# Patient Record
Sex: FEMALE
[De-identification: names, ages, dates, MRNs, and addresses within clinical notes are randomized per-mention and may not be internally consistent; named-entity substitution may affect disease eponyms.]

---

## 2018-11-19 LAB
BASOPHILS # BLD AUTO: 0 10*3/UL (ref 0–0.1)
BASOPHILS NFR BLD AUTO: 0.6 % (ref 0–1)
DEPRECATED NEUTROPHILS # BLD AUTO: 5 10*3/UL (ref 2.1–6.9)
EOSINOPHIL # BLD AUTO: 0.1 10*3/UL (ref 0–0.4)
EOSINOPHIL NFR BLD AUTO: 0.7 % (ref 0–6)
ERYTHROCYTE [DISTWIDTH] IN CORD BLOOD: 13.8 % (ref 11.7–14.4)
HCT VFR BLD AUTO: 37.7 % (ref 34.2–44.1)
HGB BLD-MCNC: 12.3 G/DL (ref 12–16)
LYMPHOCYTES # BLD: 1.3 10*3/UL (ref 1–3.2)
LYMPHOCYTES NFR BLD AUTO: 18.9 % (ref 18–39.1)
MCH RBC QN AUTO: 27.1 PG (ref 28–32)
MCHC RBC AUTO-ENTMCNC: 32.6 G/DL (ref 31–35)
MCV RBC AUTO: 83 FL (ref 81–99)
MONOCYTES # BLD AUTO: 0.5 10*3/UL (ref 0.2–0.8)
MONOCYTES NFR BLD AUTO: 7.2 % (ref 4.4–11.3)
NEUTS SEG NFR BLD AUTO: 72.3 % (ref 38.7–80)
PLATELET # BLD AUTO: 254 X10E3/UL (ref 140–360)
RBC # BLD AUTO: 4.54 X10E6/UL (ref 3.6–5.1)

## 2018-11-20 ENCOUNTER — HOSPITAL ENCOUNTER (OUTPATIENT)
Dept: HOSPITAL 88 - OR | Age: 28
Discharge: HOME | End: 2018-11-20
Attending: UROLOGY
Payer: COMMERCIAL

## 2018-11-20 VITALS — SYSTOLIC BLOOD PRESSURE: 118 MMHG | DIASTOLIC BLOOD PRESSURE: 72 MMHG

## 2018-11-20 DIAGNOSIS — R06.02: ICD-10-CM

## 2018-11-20 DIAGNOSIS — N20.1: Primary | ICD-10-CM

## 2018-11-20 DIAGNOSIS — N13.30: ICD-10-CM

## 2018-11-20 DIAGNOSIS — Z01.812: ICD-10-CM

## 2018-11-20 LAB
ANION GAP SERPL CALC-SCNC: 14.8 MMOL/L (ref 8–16)
BUN SERPL-MCNC: 9 MG/DL (ref 7–26)
BUN/CREAT SERPL: 15 (ref 6–25)
CALCIUM SERPL-MCNC: 9.3 MG/DL (ref 8.4–10.2)
CHLORIDE SERPL-SCNC: 102 MMOL/L (ref 98–107)
CO2 SERPL-SCNC: 22 MMOL/L (ref 22–29)
EGFRCR SERPLBLD CKD-EPI 2021: > 60 ML/MIN (ref 60–?)
GLUCOSE SERPLBLD-MCNC: 88 MG/DL (ref 74–118)
POTASSIUM SERPL-SCNC: 3.8 MMOL/L (ref 3.5–5.1)
SODIUM SERPL-SCNC: 135 MMOL/L (ref 136–145)

## 2018-11-20 PROCEDURE — 85025 COMPLETE CBC W/AUTO DIFF WBC: CPT

## 2018-11-20 PROCEDURE — 88300 SURGICAL PATH GROSS: CPT

## 2018-11-20 PROCEDURE — 81025 URINE PREGNANCY TEST: CPT

## 2018-11-20 PROCEDURE — 74420 UROGRAPHY RTRGR +-KUB: CPT

## 2018-11-20 PROCEDURE — 36415 COLL VENOUS BLD VENIPUNCTURE: CPT

## 2018-11-20 PROCEDURE — 52332 CYSTOSCOPY AND TREATMENT: CPT

## 2018-11-20 PROCEDURE — 52352 CYSTOURETERO W/STONE REMOVE: CPT

## 2018-11-20 PROCEDURE — 80048 BASIC METABOLIC PNL TOTAL CA: CPT

## 2018-11-20 NOTE — XMS REPORT
Patient Summary Document

                             Created on: 2018



LUNDY, FORREST

External Reference #: [id number]

: 1990

Sex: Female



Demographics







                          Address                   [id number]

[place]  [address]

 

                          Home Phone                [phone]

 

                          Preferred Language        Unknown

 

                          Marital Status            Unknown

 

                          Sikhism Affiliation     Unknown

 

                          Race                      Unknown

 

                                        Additional Race(s)  

 

                          Ethnic Group              Unknown





Author







                          Author                    [facility]

 

                          [facility]

 

                          Address                   Unknown

 

                          Phone                     Unavailable







Support







                Name            Relationship    Address         Phone

 

                    CHIP AVELAR     PRS                 [address] 

[id number]

[place], [place]  [id number] [phone]

 

                    CHIP AVELAR     PRS                 [address] 

[id number]

[place], [place]  [id number] [phone]







Care Team Providers







                    Care Team Member Name    Role                Phone

 

                          Unavailable               Unavailable







Payers







             Payer Name    Policy Type    Policy Number    Effective Date    Expiration Date







Problems

This patient has no known problems.



Allergies, Adverse Reactions, Alerts







          Allergy Name    Allergy Type    Status    Severity    Reaction(s)    Onset Date    Inactive 

Date                      Treating Clinician        Comments

 

        No Known Allergies    DA      Active    U               2018 00:00:00                     







Medications

This patient has no known medications.



Encounters







             Start Date/Time    End Date/Time    Encounter Type    Admission Type    Attending [facility] Facility       Care Department     Encounter ID

 

        2018-02-15 00:00:00            Inpatient                    [facility]     [id number]

 

        2018-02-15 00:00:00            Inpatient                    [facility]     [id number]

 

        2018 20:00:00    2018 20:00:00    Emergency                    [facility]     [id number]

 

        2018 00:00:00    2018 00:00:00    Outpatient                    [facility]     [id number]

 

        2018-10-30 16:21:52    2018-10-30 16:21:52    Outpatient                    [facility]     [id number]

 

        2018 13:49:11    2018 13:49:11    Outpatient                    [facility]     [id number]

 

        2018 10:25:34    2018 10:25:34    Outpatient                    [facility]     [id number]

 

        2018 17:25:29    2018 17:25:29    Outpatient                    [facility]     [id number]

 

        2018 00:00:00    2018 00:00:00    Outpatient                    [facility]     [id number]

 

        2018 00:00:00    2018 00:00:00    Outpatient                    [facility]     [id number]

 

        2018-02-15 03:43:28    2018-02-15 03:43:28    Inpatient                    [facility]     [id number]

 

        2018 09:21:28    2018 09:21:28    Outpatient                    [facility]     [id number]

 

        2018 09:32:25    2018 09:32:25    Outpatient                    [facility]     [id number]

 

        2018 12:58:47    2018 12:58:47    Outpatient                    [facility]     [id number]

 

        2018 11:20:09    2018 11:20:09    Outpatient                    [facility]     [id number]

 

        2018 00:00:00    2018 00:00:00    Outpatient                    [facility]     [id number]

 

        2018 13:06:20    2018 13:06:20    Outpatient                    [facility]     [id number]

 

        2018 00:00:00    2018 00:00:00    Outpatient                    [facility]     [id number]

 

        2018-01-10 00:00:00    2018-01-10 00:00:00    Outpatient                    [facility]     [id number]

 

        2018 10:46:53    2018 10:46:53    Outpatient                    [facility]     [id number]

 

        2018 00:00:00    2018 00:00:00    Outpatient                    [facility]     [id number]

 

        2017 07:35:24    2017 07:35:24    Outpatient                    [facility]     [id number]

 

        2017 00:00:00    2017 00:00:00    Outpatient                    [facility]     [id number]

 

        2017 07:53:14    2017 07:53:14    Outpatient                    [facility]     [id number]

 

        2017 13:56:35    2017 13:56:35    Outpatient                    [facility]     [id number]

 

        2017 12:50:52    2017 12:50:52    Outpatient                    [facility]     [id number]

 

        2017-10-13 10:45:08    2017-10-13 10:45:08    Outpatient                    [facility]     [id number]

 

        2017-10-09 13:47:39    2017-10-09 13:47:39    Outpatient                    [facility]     [id number]

 

        2017-10-04 15:26:32    2017-10-04 15:26:32    Outpatient                    [facility]     [id number]

 

        2017-10-04 15:15:00    2017-10-04 15:15:00    Outpatient                    [facility]     [id number]

 

        2017-10-04 00:00:00    2017-10-04 00:00:00    Outpatient                    [facility]     [id number]

 

        2017-09-15 11:48:22    2017-09-15 11:48:22    Outpatient                    [facility]     [id number]

 

        2017-09-15 10:59:25    2017-09-15 10:59:25    Outpatient                    [facility]     [id number]

 

        2017 00:00:00    2017 00:00:00    Outpatient                    [facility]     [id number]

 

        2017 08:30:52    2017 08:30:52    Outpatient                    [facility]     [id number]

 

        2017 14:46:01    2017 14:46:01    Outpatient                    [facility]     [id number]

 

        2017 10:02:47    2017 10:02:47    Outpatient                    [facility]     [id number]

 

        2017 08:24:44    2017 08:24:44    Outpatient                    [facility]     [id number]

 

        2017 12:41:38    2017 12:41:38    Outpatient                    [facility]     [id number]

 

        2017 10:16:08    2017 10:16:08    Outpatient                    [facility]     [id number]

 

        2017 09:08:23    2017 09:08:23    Outpatient                    [facility]     [id number]